# Patient Record
Sex: MALE | Race: WHITE | NOT HISPANIC OR LATINO
[De-identification: names, ages, dates, MRNs, and addresses within clinical notes are randomized per-mention and may not be internally consistent; named-entity substitution may affect disease eponyms.]

---

## 2017-04-11 PROBLEM — Z00.00 ENCOUNTER FOR PREVENTIVE HEALTH EXAMINATION: Status: ACTIVE | Noted: 2017-04-11

## 2017-05-09 ENCOUNTER — APPOINTMENT (OUTPATIENT)
Dept: OTOLARYNGOLOGY | Facility: CLINIC | Age: 47
End: 2017-05-09

## 2017-05-09 VITALS
HEART RATE: 84 BPM | DIASTOLIC BLOOD PRESSURE: 111 MMHG | SYSTOLIC BLOOD PRESSURE: 159 MMHG | WEIGHT: 230 LBS | HEIGHT: 68 IN | BODY MASS INDEX: 34.86 KG/M2

## 2017-05-09 DIAGNOSIS — Z86.39 PERSONAL HISTORY OF OTHER ENDOCRINE, NUTRITIONAL AND METABOLIC DISEASE: ICD-10-CM

## 2017-05-09 DIAGNOSIS — Z82.49 FAMILY HISTORY OF ISCHEMIC HEART DISEASE AND OTHER DISEASES OF THE CIRCULATORY SYSTEM: ICD-10-CM

## 2017-05-09 DIAGNOSIS — Z80.2 FAMILY HISTORY OF MALIGNANT NEOPLASM OF OTHER RESPIRATORY AND INTRATHORACIC ORGANS: ICD-10-CM

## 2017-05-09 DIAGNOSIS — J34.2 DEVIATED NASAL SEPTUM: ICD-10-CM

## 2017-05-09 DIAGNOSIS — Z78.9 OTHER SPECIFIED HEALTH STATUS: ICD-10-CM

## 2017-05-09 DIAGNOSIS — R49.0 DYSPHONIA: ICD-10-CM

## 2017-05-09 DIAGNOSIS — Z83.3 FAMILY HISTORY OF DIABETES MELLITUS: ICD-10-CM

## 2017-05-09 RX ORDER — LEVOTHYROXINE SODIUM 175 UG/1
175 TABLET ORAL
Refills: 0 | Status: ACTIVE | COMMUNITY

## 2018-07-23 PROBLEM — Z78.9 ALCOHOL USE: Status: ACTIVE | Noted: 2017-05-09

## 2019-01-09 ENCOUNTER — APPOINTMENT (OUTPATIENT)
Dept: DERMATOLOGY | Facility: CLINIC | Age: 49
End: 2019-01-09
Payer: COMMERCIAL

## 2019-01-09 PROCEDURE — 17110 DESTRUCTION B9 LES UP TO 14: CPT

## 2019-01-09 PROCEDURE — 99202 OFFICE O/P NEW SF 15 MIN: CPT | Mod: 25

## 2020-12-23 ENCOUNTER — APPOINTMENT (OUTPATIENT)
Dept: UROLOGY | Facility: CLINIC | Age: 50
End: 2020-12-23
Payer: COMMERCIAL

## 2020-12-23 VITALS
DIASTOLIC BLOOD PRESSURE: 114 MMHG | RESPIRATION RATE: 17 BRPM | WEIGHT: 249 LBS | HEIGHT: 68 IN | HEART RATE: 78 BPM | SYSTOLIC BLOOD PRESSURE: 168 MMHG | TEMPERATURE: 97.7 F | BODY MASS INDEX: 37.74 KG/M2

## 2020-12-23 PROCEDURE — 99072 ADDL SUPL MATRL&STAF TM PHE: CPT

## 2020-12-23 PROCEDURE — 99244 OFF/OP CNSLTJ NEW/EST MOD 40: CPT

## 2020-12-23 NOTE — REVIEW OF SYSTEMS
[Told you have blood in urine on a urine test] : told blood was present in a urine test [Strain or push to urinate] : strain or push to urinate [Slow urine stream] : slow urine stream [Negative] : Heme/Lymph

## 2020-12-25 LAB
APPEARANCE: CLEAR
BACTERIA UR CULT: NORMAL
BACTERIA: NEGATIVE
BILIRUBIN URINE: NEGATIVE
BLOOD URINE: NEGATIVE
COLOR: YELLOW
GLUCOSE QUALITATIVE U: NEGATIVE
HYALINE CASTS: 0 /LPF
KETONES URINE: NEGATIVE
LEUKOCYTE ESTERASE URINE: NEGATIVE
MICROSCOPIC-UA: NORMAL
NITRITE URINE: NEGATIVE
PH URINE: 6
PROTEIN URINE: NORMAL
RED BLOOD CELLS URINE: 0 /HPF
SPECIFIC GRAVITY URINE: 1.02
SQUAMOUS EPITHELIAL CELLS: 0 /HPF
URINE CYTOLOGY: NORMAL
UROBILINOGEN URINE: NORMAL
WHITE BLOOD CELLS URINE: 0 /HPF

## 2020-12-26 NOTE — LETTER HEADER
[FreeTextEntry3] : Chino Miramontes MD, PhD\par [facility]\par [address]\par [address] [place] [address]\[place] [address]

## 2020-12-26 NOTE — ADDENDUM
[FreeTextEntry1] : I, Ibeth Jamain, acted solely as a scribe for Dr. Chino Miramontes on this date 12/23/2020.\par \par All medical record entries made by the Scribe were at my, Dr. Chino Miramontes, direction and personally dictated by me on 12/23/2020. I have reviewed the chart and agree that the record accurately reflects my personal performance of the history, physical exam, assessment and plan.  I have also personally directed, reviewed and agreed with the chart.

## 2020-12-26 NOTE — ASSESSMENT
[FreeTextEntry1] : 12/23/2020: Mr. Holland is a 49y/o M presenting today for evaluation of pain in testicles. Complains of dull pain in R testicles only on palpation x 1 month. Denies pain on sitting. He initially felt sharp pain which resolved with drinking more water. Has not been drinking water recently and now feels a dull pain. Reports UA one month ago showing microscopic hematuria. Denies nocturia but admits pushing and straining. No h/o kidney stones. Diet of dairy and carbs. Reviewed lab work from 12/5/20 showing mildly elevated LFTs. Serum glucose elevated at 133. HbA1c was 6.2. PSA was 1.22. No asthma, seizures, or cardiac stents. Will be going for nuclear stress test in January. Works as . \par \par Digital rectal exam found no suspicious rectal masses. No rectal mucosal lesions. Anal tone is normal. The prostate is non tender, with normal texture, discrete borders, and no nodules. It is a 30 gram transurethral resection size. No gross blood on examining fingers. \par \par I prescribed the patient Bactrim BID with food x 2 weeks for orchitis. I advised the patient there is a chance of kidney insufficiency and upset stomach. \par I prescribed the patient Tamsulosin 0.4 mg once daily half hour after a meal for BPH. I advised the patient the side effects of nasal congestion, light-headedness, and lack of seminal emission. \par \par The patient produced a urine sample which will be sent for urinalysis, urine cytology, and urine culture. \par \par RTO in 2 weeks for US duplex scrotal.

## 2020-12-26 NOTE — LETTER BODY
[Dear  ___] : Dear  [unfilled], [Consult Letter:] : I had the pleasure of evaluating your patient, [unfilled]. [Please see my note below.] : Please see my note below. [Consult Closing:] : Thank you very much for allowing me to participate in the care of this patient.  If you have any questions, please do not hesitate to contact me. [Sincerely,] : Sincerely, [FreeTextEntry2] : Dr. Tavon Espinoza\par [address]\[place] [address] [FreeTextEntry1] : 12/23/2020: Mr. Holland is a 49y/o M presenting today for evaluation of pain in testicles. Complains of dull pain in R testicles only on palpation x 1 month. Denies pain on sitting. He initially felt sharp pain which resolved with drinking more water. Has not been drinking water recently and now feels a dull pain. Reports UA one month ago showing microscopic hematuria. Denies nocturia but admits pushing and straining. No h/o kidney stones. Diet of dairy and carbs. Reviewed lab work from 12/5/20 showing mildly elevated LFTs. Serum glucose elevated at 133. HbA1c was 6.2. PSA was 1.22. No asthma, seizures, or cardiac stents. Will be going for nuclear stress test in January. Works as . \par \par Digital rectal exam found no suspicious rectal masses. No rectal mucosal lesions. Anal tone is normal. The prostate is non tender, with normal texture, discrete borders, and no nodules. It is a 30 gram transurethral resection size. No gross blood on examining fingers. \par \par I prescribed the patient Bactrim BID with food x 2 weeks for orchitis. I advised the patient there is a chance of kidney insufficiency and upset stomach. \par I prescribed the patient Tamsulosin 0.4 mg once daily half hour after a meal for BPH. I advised the patient the side effects of nasal congestion, light-headedness, and lack of seminal emission. \par \par The patient produced a urine sample which will be sent for urinalysis, urine cytology, and urine culture. \par \par RTO in 2 weeks for US duplex scrotal.  [FreeTextEntry3] : Chino Miramontes MD, PhD

## 2020-12-26 NOTE — PHYSICAL EXAM
[General Appearance - Well Developed] : well developed [Normal Appearance] : normal appearance [General Appearance - In No Acute Distress] : no acute distress [Edema] : no peripheral edema [Respiration, Rhythm And Depth] : normal respiratory rhythm and effort [Exaggerated Use Of Accessory Muscles For Inspiration] : no accessory muscle use [Abdomen Soft] : soft [Abdomen Tenderness] : non-tender [Normal Station and Gait] : the gait and station were normal for the patient's age [] : no rash [No Focal Deficits] : no focal deficits [Oriented To Time, Place, And Person] : oriented to person, place, and time [Affect] : the affect was normal [Mood] : the mood was normal [Not Anxious] : not anxious [Heart Rate And Rhythm] : Heart rate and rhythm were normal [Abdomen Hernia] : no hernia was discovered [Costovertebral Angle Tenderness] : no ~M costovertebral angle tenderness [Urethral Meatus] : meatus normal [Penis Abnormality] : normal circumcised penis [Cervical Lymph Nodes Enlarged Posterior Bilaterally] : posterior cervical [Cervical Lymph Nodes Enlarged Anterior Bilaterally] : anterior cervical [Supraclavicular Lymph Nodes Enlarged Bilaterally] : supraclavicular [Femoral Lymph Nodes Enlarged Bilaterally] : femoral [Inguinal Lymph Nodes Enlarged Bilaterally] : inguinal [FreeTextEntry1] : No submandibular gland tenderness.

## 2021-01-13 ENCOUNTER — OUTPATIENT (OUTPATIENT)
Dept: OUTPATIENT SERVICES | Facility: HOSPITAL | Age: 51
LOS: 1 days | End: 2021-01-13
Payer: COMMERCIAL

## 2021-01-13 ENCOUNTER — APPOINTMENT (OUTPATIENT)
Dept: UROLOGY | Facility: CLINIC | Age: 51
End: 2021-01-13
Payer: COMMERCIAL

## 2021-01-13 DIAGNOSIS — R35.0 FREQUENCY OF MICTURITION: ICD-10-CM

## 2021-01-13 PROCEDURE — 76870 US EXAM SCROTUM: CPT

## 2021-01-13 PROCEDURE — 99072 ADDL SUPL MATRL&STAF TM PHE: CPT

## 2021-01-13 PROCEDURE — 99214 OFFICE O/P EST MOD 30 MIN: CPT | Mod: 25

## 2021-01-13 PROCEDURE — 76870 US EXAM SCROTUM: CPT | Mod: 26

## 2021-01-13 RX ORDER — TAMSULOSIN HYDROCHLORIDE 0.4 MG/1
0.4 CAPSULE ORAL
Qty: 30 | Refills: 11 | Status: COMPLETED | COMMUNITY
Start: 2020-12-23 | End: 2021-01-13

## 2021-01-13 RX ORDER — ALFUZOSIN HYDROCHLORIDE 10 MG/1
10 TABLET, EXTENDED RELEASE ORAL
Qty: 30 | Refills: 11 | Status: ACTIVE | COMMUNITY
Start: 2021-01-13 | End: 1900-01-01

## 2021-01-13 NOTE — ASSESSMENT
[FreeTextEntry1] : 12/23/2020: Mr. Obando is a 51y/o M presenting today for evaluation of pain in testicles. Complains of dull pain in R testicles only on palpation x 1 month. Denies pain on sitting. He initially felt sharp pain which resolved with drinking more water. Has not been drinking water recently and now feels a dull pain. Reports UA one month ago showing microscopic hematuria. Denies nocturia but admits pushing and straining. No h/o kidney stones. Diet of dairy and carbs. Reviewed lab work from 12/5/20 showing mildly elevated LFTs. Serum glucose elevated at 133. HbA1c was 6.2. PSA was 1.22. No asthma, seizures, or cardiac stents. Will be going for nuclear stress test in January. Works as . Digital rectal exam found no suspicious rectal masses. No rectal mucosal lesions. Anal tone is normal. The prostate is non tender, with normal texture, discrete borders, and no nodules. It is a 30 gram transurethral resection size. No gross blood on examining fingers. \par \par 01/13/2021: Patient presents today for duplex scrotal US. Results show b/l varicoceles, left larger than right, and cyst of left testicle. No hydrocele, spermatocele, intratesticular masses. Epididymides are normal. Denies pain on coughing or heavy lifting. Last visit, pt was prescribed Tamsulosin once daily which did improve his urination, but stopped as he was concerned with lack of seminal emission. On 12/23/20, urine culture showed no growth, UA was normal. Creatinine normal at 1.01. No FHx of prostate cancer. \par \par D/c Tamsulosin, and will instead start Alfuzosin 10mg, once daily with food. I informed the patient of lightheadedness as a side effect. \par \par Patient will schedule telehealth visit in 2 weeks.

## 2021-01-13 NOTE — HISTORY OF PRESENT ILLNESS
[FreeTextEntry1] : 12/23/2020: Mr. Obando is a 49y/o M presenting today for evaluation of pain in testicles. Complains of dull pain in R testicles only on palpation x 1 month. Denies pain on sitting. He initially felt sharp pain which resolved with drinking more water. Has not been drinking water recently and now feels a dull pain. Reports UA one month ago showing microscopic hematuria. Denies nocturia but admits pushing and straining. No h/o kidney stones. Diet of dairy and carbs. Reviewed lab work from 12/5/20 showing mildly elevated LFTs. Serum glucose elevated at 133. HbA1c was 6.2. PSA was 1.22. No asthma, seizures, or cardiac stents. Will be going for nuclear stress test in January. Works as . Digital rectal exam found no suspicious rectal masses. No rectal mucosal lesions. Anal tone is normal. The prostate is non tender, with normal texture, discrete borders, and no nodules. It is a 30 gram transurethral resection size. No gross blood on examining fingers. \par \par 01/13/2021: Patient presents today for duplex scrotal US. Results show b/l varicoceles, left larger than right, and cyst of left testicle. No hydrocele, spermatocele, intratesticular masses. Epididymides are normal. Denies pain on coughing or heavy lifting. Last visit, pt was prescribed Tamsulosin once daily which did improve his urination, but stopped as he was concerned with lack of seminal emission. On 12/23/20, urine culture showed no growth, UA was normal. Creatinine normal at 1.01. No FHx of prostate cancer.

## 2021-01-13 NOTE — ADDENDUM
[FreeTextEntry1] : I, Ibeth Jamain, acted solely as a scribe for Dr. Chino Miramontes on this date 01/13/2021.\par \par All medical record entries made by the Scribe were at my, Dr. Chino Miramontes, direction and personally dictated by me on 01/13/2021. I have reviewed the chart and agree that the record accurately reflects my personal performance of the history, physical exam, assessment and plan.  I have also personally directed, reviewed and agreed with the chart.

## 2021-01-13 NOTE — PHYSICAL EXAM
[General Appearance - Well Developed] : well developed [Normal Appearance] : normal appearance [General Appearance - In No Acute Distress] : no acute distress [Abdomen Tenderness] : non-tender [Abdomen Soft] : soft [Edema] : no peripheral edema [] : no respiratory distress [Respiration, Rhythm And Depth] : normal respiratory rhythm and effort [Exaggerated Use Of Accessory Muscles For Inspiration] : no accessory muscle use [Oriented To Time, Place, And Person] : oriented to person, place, and time [Affect] : the affect was normal [Mood] : the mood was normal [Not Anxious] : not anxious [Normal Station and Gait] : the gait and station were normal for the patient's age [No Focal Deficits] : no focal deficits

## 2021-01-15 DIAGNOSIS — N40.1 BENIGN PROSTATIC HYPERPLASIA WITH LOWER URINARY TRACT SYMPTOMS: ICD-10-CM

## 2021-01-15 DIAGNOSIS — N45.2 ORCHITIS: ICD-10-CM

## 2021-07-09 ENCOUNTER — NON-APPOINTMENT (OUTPATIENT)
Age: 51
End: 2021-07-09

## 2021-07-13 ENCOUNTER — APPOINTMENT (OUTPATIENT)
Dept: UROLOGY | Facility: CLINIC | Age: 51
End: 2021-07-13

## 2021-08-05 ENCOUNTER — APPOINTMENT (OUTPATIENT)
Dept: UROLOGY | Facility: CLINIC | Age: 51
End: 2021-08-05
Payer: COMMERCIAL

## 2021-08-05 VITALS
DIASTOLIC BLOOD PRESSURE: 89 MMHG | SYSTOLIC BLOOD PRESSURE: 143 MMHG | RESPIRATION RATE: 16 BRPM | OXYGEN SATURATION: 98 % | HEIGHT: 68 IN | HEART RATE: 85 BPM | BODY MASS INDEX: 37.89 KG/M2 | WEIGHT: 250 LBS

## 2021-08-05 DIAGNOSIS — N13.8 BENIGN PROSTATIC HYPERPLASIA WITH LOWER URINARY TRACT SYMPMS: ICD-10-CM

## 2021-08-05 DIAGNOSIS — L72.9 FOLLICULAR CYST OF THE SKIN AND SUBCUTANEOUS TISSUE, UNSPECIFIED: ICD-10-CM

## 2021-08-05 DIAGNOSIS — N45.2 ORCHITIS: ICD-10-CM

## 2021-08-05 DIAGNOSIS — N40.1 BENIGN PROSTATIC HYPERPLASIA WITH LOWER URINARY TRACT SYMPMS: ICD-10-CM

## 2021-08-05 PROCEDURE — 99214 OFFICE O/P EST MOD 30 MIN: CPT

## 2021-08-05 RX ORDER — SULFAMETHOXAZOLE AND TRIMETHOPRIM 800; 160 MG/1; MG/1
800-160 TABLET ORAL
Qty: 28 | Refills: 0 | Status: ACTIVE | COMMUNITY
Start: 2020-12-23 | End: 1900-01-01

## 2021-08-08 LAB
APPEARANCE: CLEAR
BACTERIA: NEGATIVE
BILIRUBIN URINE: NEGATIVE
BLOOD URINE: NEGATIVE
COLOR: YELLOW
GLUCOSE QUALITATIVE U: NEGATIVE
HYALINE CASTS: 0 /LPF
KETONES URINE: NEGATIVE
LEUKOCYTE ESTERASE URINE: NEGATIVE
MICROSCOPIC-UA: NORMAL
NITRITE URINE: NEGATIVE
PH URINE: 7
PROTEIN URINE: NORMAL
RED BLOOD CELLS URINE: 1 /HPF
SPECIFIC GRAVITY URINE: 1.02
SQUAMOUS EPITHELIAL CELLS: 0 /HPF
UROBILINOGEN URINE: NORMAL
WHITE BLOOD CELLS URINE: 0 /HPF

## 2021-08-08 NOTE — ASSESSMENT
[FreeTextEntry1] : 12/23/2020: Mr. Obando is a 52y/o M presenting today for evaluation of pain in testicles. Complains of dull pain in R testicles only on palpation x 1 month. Denies pain on sitting. He initially felt sharp pain which resolved with drinking more water. Has not been drinking water recently and now feels a dull pain. Reports UA one month ago showing microscopic hematuria. Denies nocturia but admits pushing and straining. No h/o kidney stones. Diet of dairy and carbs. Reviewed lab work from 12/5/20 showing mildly elevated LFTs. Serum glucose elevated at 133. HbA1c was 6.2. PSA was 1.22. No asthma, seizures, or cardiac stents. Will be going for nuclear stress test in January. Works as . Digital rectal exam found no suspicious rectal masses. No rectal mucosal lesions. Anal tone is normal. The prostate is non tender, with normal texture, discrete borders, and no nodules. It is a 30 gram transurethral resection size. No gross blood on examining fingers. \par \par 01/13/2021: Patient presents today for duplex scrotal US. Results show b/l varicoceles, left larger than right, and cyst of left testicle. No hydrocele, spermatocele, intratesticular masses. Epididymides are normal. Denies pain on coughing or heavy lifting. Last visit, pt was prescribed Tamsulosin once daily which did improve his urination, but stopped as he was concerned with lack of seminal emission. On 12/23/20, urine culture showed no growth, UA was normal. Creatinine normal at 1.01. No FHx of prostate cancer. \par \par 08/05/2021: Patient presents today for follow up. Patient states he did not start Alfuzosin as prescribed last visit as he was concerned about side effects and did not want to start another medication. He took Tamsulosin in the past, his urination was better. Took an antibiotic in the past with which resolved testicular pain. Additionally complains of right testicular soreness. He also notes that he feels a nodule in his groin. \par \par I explained to the patient that starting Alfuzosin will improve his urination and he will have normal ejaculatory volume, which was his concern with Tamsulosin. Patient is agreeable with starting Alfuzosin ER 10mg once daily. \par \par I prescribed the patient Bactrim BID for right testicular soreness. I advised the patient there is a chance of kidney insufficiency and upset stomach. \par \par Recommend warm soaks for his sebaceous cyst in groin.\par \par The patient produced a urine sample which will be sent for urinalysis, urine cytology, and urine culture. \par \par RTO in 3-4 weeks for follow up. \par \par Preparation, in-person office visit, and coordination of care took: 30 minutes

## 2021-08-08 NOTE — PHYSICAL EXAM
[General Appearance - Well Developed] : well developed [Normal Appearance] : normal appearance [General Appearance - In No Acute Distress] : no acute distress [Abdomen Soft] : soft [Abdomen Tenderness] : non-tender [Edema] : no peripheral edema [] : no respiratory distress [Respiration, Rhythm And Depth] : normal respiratory rhythm and effort [Exaggerated Use Of Accessory Muscles For Inspiration] : no accessory muscle use [Oriented To Time, Place, And Person] : oriented to person, place, and time [Affect] : the affect was normal [Mood] : the mood was normal [Not Anxious] : not anxious [Normal Station and Gait] : the gait and station were normal for the patient's age [No Focal Deficits] : no focal deficits [Penis Abnormality] : normal circumcised penis [Epididymis] : the epididymides were normal [Testes Tenderness] : no tenderness of the testes [Testes Mass (___cm)] : there were no testicular masses [Inguinal Lymph Nodes Enlarged Bilaterally] : inguinal [FreeTextEntry1] : ovoid subcutaneous mass at junction between scrotum and mons pubis, it is mainly in scrotum, mobile, nontender, about 1 cm x 0.8 cm, long axis is superior to inferior. overlying skin not cellulitic. epidermis is intact. testes b/l descended. L testis is 22 cc. R testis is 19 cc. normal sensitivity. complains of right testicular soreness, but not clinically tender in neither right nor left.

## 2021-08-08 NOTE — HISTORY OF PRESENT ILLNESS
[FreeTextEntry1] : 12/23/2020: Mr. Obando is a 51y/o M presenting today for evaluation of pain in testicles. Complains of dull pain in R testicles only on palpation x 1 month. Denies pain on sitting. He initially felt sharp pain which resolved with drinking more water. Has not been drinking water recently and now feels a dull pain. Reports UA one month ago showing microscopic hematuria. Denies nocturia but admits pushing and straining. No h/o kidney stones. Diet of dairy and carbs. Reviewed lab work from 12/5/20 showing mildly elevated LFTs. Serum glucose elevated at 133. HbA1c was 6.2. PSA was 1.22. No asthma, seizures, or cardiac stents. Will be going for nuclear stress test in January. Works as . Digital rectal exam found no suspicious rectal masses. No rectal mucosal lesions. Anal tone is normal. The prostate is non tender, with normal texture, discrete borders, and no nodules. It is a 30 gram transurethral resection size. No gross blood on examining fingers. \par \par 01/13/2021: Patient presents today for duplex scrotal US. Results show b/l varicoceles, left larger than right, and cyst of left testicle. No hydrocele, spermatocele, intratesticular masses. Epididymides are normal. Denies pain on coughing or heavy lifting. Last visit, pt was prescribed Tamsulosin once daily which did improve his urination, but stopped as he was concerned with lack of seminal emission. On 12/23/20, urine culture showed no growth, UA was normal. Creatinine normal at 1.01. No FHx of prostate cancer. \par \par 08/05/2021: Patient presents today for follow up. Patient states he did not start Alfuzosin as prescribed last visit as he was concerned about side effects and did not want to start another medication. He took Tamsulosin in the past, his urination was better. Took an antibiotic in the past with which resolved testicular pain. Additionally complains of right testicular soreness. He also notes that he feels a nodule in his groin.

## 2021-08-16 LAB
BACTERIA UR CULT: NORMAL
URINE CYTOLOGY: NORMAL

## 2021-08-19 ENCOUNTER — APPOINTMENT (OUTPATIENT)
Dept: UROLOGY | Facility: CLINIC | Age: 51
End: 2021-08-19

## 2022-02-11 ENCOUNTER — EMERGENCY (EMERGENCY)
Facility: HOSPITAL | Age: 52
LOS: 1 days | Discharge: HOME | End: 2022-02-11
Attending: EMERGENCY MEDICINE
Payer: COMMERCIAL

## 2022-02-11 VITALS
RESPIRATION RATE: 20 BRPM | OXYGEN SATURATION: 98 % | TEMPERATURE: 98 F | SYSTOLIC BLOOD PRESSURE: 174 MMHG | HEIGHT: 69 IN | DIASTOLIC BLOOD PRESSURE: 98 MMHG | HEART RATE: 107 BPM | WEIGHT: 250 LBS

## 2022-02-11 DIAGNOSIS — R07.9 CHEST PAIN, UNSPECIFIED: ICD-10-CM

## 2022-02-11 DIAGNOSIS — I10 ESSENTIAL (PRIMARY) HYPERTENSION: ICD-10-CM

## 2022-02-11 LAB
ALBUMIN SERPL ELPH-MCNC: 4.7 G/DL — SIGNIFICANT CHANGE UP (ref 3.3–5.2)
ALP SERPL-CCNC: 59 U/L — SIGNIFICANT CHANGE UP (ref 40–120)
ALT FLD-CCNC: 86 U/L — HIGH
ANION GAP SERPL CALC-SCNC: 14 MMOL/L — SIGNIFICANT CHANGE UP (ref 5–17)
AST SERPL-CCNC: 46 U/L — HIGH
BASOPHILS # BLD AUTO: 0.08 K/UL — SIGNIFICANT CHANGE UP (ref 0–0.2)
BASOPHILS NFR BLD AUTO: 0.8 % — SIGNIFICANT CHANGE UP (ref 0–2)
BILIRUB SERPL-MCNC: 0.3 MG/DL — LOW (ref 0.4–2)
BUN SERPL-MCNC: 17 MG/DL — SIGNIFICANT CHANGE UP (ref 8–20)
CALCIUM SERPL-MCNC: 10 MG/DL — SIGNIFICANT CHANGE UP (ref 8.6–10.2)
CHLORIDE SERPL-SCNC: 103 MMOL/L — SIGNIFICANT CHANGE UP (ref 98–107)
CO2 SERPL-SCNC: 23 MMOL/L — SIGNIFICANT CHANGE UP (ref 22–29)
CREAT SERPL-MCNC: 0.87 MG/DL — SIGNIFICANT CHANGE UP (ref 0.5–1.3)
D DIMER BLD IA.RAPID-MCNC: <150 NG/ML DDU — SIGNIFICANT CHANGE UP
EOSINOPHIL # BLD AUTO: 0.14 K/UL — SIGNIFICANT CHANGE UP (ref 0–0.5)
EOSINOPHIL NFR BLD AUTO: 1.5 % — SIGNIFICANT CHANGE UP (ref 0–6)
GLUCOSE SERPL-MCNC: 104 MG/DL — HIGH (ref 70–99)
HCT VFR BLD CALC: 38.4 % — LOW (ref 39–50)
HGB BLD-MCNC: 13.7 G/DL — SIGNIFICANT CHANGE UP (ref 13–17)
IMM GRANULOCYTES NFR BLD AUTO: 0.4 % — SIGNIFICANT CHANGE UP (ref 0–1.5)
LYMPHOCYTES # BLD AUTO: 1.73 K/UL — SIGNIFICANT CHANGE UP (ref 1–3.3)
LYMPHOCYTES # BLD AUTO: 18.1 % — SIGNIFICANT CHANGE UP (ref 13–44)
MCHC RBC-ENTMCNC: 28.4 PG — SIGNIFICANT CHANGE UP (ref 27–34)
MCHC RBC-ENTMCNC: 35.7 GM/DL — SIGNIFICANT CHANGE UP (ref 32–36)
MCV RBC AUTO: 79.5 FL — LOW (ref 80–100)
MONOCYTES # BLD AUTO: 0.86 K/UL — SIGNIFICANT CHANGE UP (ref 0–0.9)
MONOCYTES NFR BLD AUTO: 9 % — SIGNIFICANT CHANGE UP (ref 2–14)
NEUTROPHILS # BLD AUTO: 6.69 K/UL — SIGNIFICANT CHANGE UP (ref 1.8–7.4)
NEUTROPHILS NFR BLD AUTO: 70.2 % — SIGNIFICANT CHANGE UP (ref 43–77)
PLATELET # BLD AUTO: 273 K/UL — SIGNIFICANT CHANGE UP (ref 150–400)
POTASSIUM SERPL-MCNC: 3.9 MMOL/L — SIGNIFICANT CHANGE UP (ref 3.5–5.3)
POTASSIUM SERPL-SCNC: 3.9 MMOL/L — SIGNIFICANT CHANGE UP (ref 3.5–5.3)
PROT SERPL-MCNC: 7.3 G/DL — SIGNIFICANT CHANGE UP (ref 6.6–8.7)
RBC # BLD: 4.83 M/UL — SIGNIFICANT CHANGE UP (ref 4.2–5.8)
RBC # FLD: 13 % — SIGNIFICANT CHANGE UP (ref 10.3–14.5)
SARS-COV-2 RNA SPEC QL NAA+PROBE: SIGNIFICANT CHANGE UP
SODIUM SERPL-SCNC: 139 MMOL/L — SIGNIFICANT CHANGE UP (ref 135–145)
TROPONIN T SERPL-MCNC: <0.01 NG/ML — SIGNIFICANT CHANGE UP (ref 0–0.06)
TROPONIN T SERPL-MCNC: <0.01 NG/ML — SIGNIFICANT CHANGE UP (ref 0–0.06)
WBC # BLD: 9.54 K/UL — SIGNIFICANT CHANGE UP (ref 3.8–10.5)
WBC # FLD AUTO: 9.54 K/UL — SIGNIFICANT CHANGE UP (ref 3.8–10.5)

## 2022-02-11 PROCEDURE — 93010 ELECTROCARDIOGRAM REPORT: CPT | Mod: 76

## 2022-02-11 PROCEDURE — 99220: CPT

## 2022-02-11 PROCEDURE — 71046 X-RAY EXAM CHEST 2 VIEWS: CPT | Mod: 26

## 2022-02-11 RX ORDER — TIMOLOL 0.5 %
1 DROPS OPHTHALMIC (EYE) EVERY 12 HOURS
Refills: 0 | Status: DISCONTINUED | OUTPATIENT
Start: 2022-02-11 | End: 2022-02-11

## 2022-02-11 RX ORDER — LISINOPRIL 2.5 MG/1
5 TABLET ORAL DAILY
Refills: 0 | Status: DISCONTINUED | OUTPATIENT
Start: 2022-02-11 | End: 2022-02-16

## 2022-02-11 RX ORDER — ASPIRIN/CALCIUM CARB/MAGNESIUM 324 MG
162 TABLET ORAL ONCE
Refills: 0 | Status: COMPLETED | OUTPATIENT
Start: 2022-02-11 | End: 2022-02-11

## 2022-02-11 RX ORDER — BRIMONIDINE TARTRATE 2 MG/MG
1 SOLUTION/ DROPS OPHTHALMIC EVERY 12 HOURS
Refills: 0 | Status: DISCONTINUED | OUTPATIENT
Start: 2022-02-11 | End: 2022-02-11

## 2022-02-11 RX ORDER — LEVOTHYROXINE SODIUM 125 MCG
224 TABLET ORAL DAILY
Refills: 0 | Status: DISCONTINUED | OUTPATIENT
Start: 2022-02-11 | End: 2022-02-16

## 2022-02-11 RX ORDER — TAMSULOSIN HYDROCHLORIDE 0.4 MG/1
0.4 CAPSULE ORAL
Refills: 0 | Status: DISCONTINUED | OUTPATIENT
Start: 2022-02-11 | End: 2022-02-16

## 2022-02-11 RX ORDER — MORPHINE SULFATE 50 MG/1
2 CAPSULE, EXTENDED RELEASE ORAL ONCE
Refills: 0 | Status: DISCONTINUED | OUTPATIENT
Start: 2022-02-11 | End: 2022-02-11

## 2022-02-11 RX ADMIN — MORPHINE SULFATE 2 MILLIGRAM(S): 50 CAPSULE, EXTENDED RELEASE ORAL at 19:14

## 2022-02-11 RX ADMIN — Medication 162 MILLIGRAM(S): at 22:55

## 2022-02-11 RX ADMIN — Medication 162 MILLIGRAM(S): at 20:26

## 2022-02-11 NOTE — CONSULT NOTE ADULT - PROBLEM SELECTOR RECOMMENDATION 9
Silodosin (started 3 weeks ago)  unusual tiredness or weakness  ro PE Silodosin (started 3 weeks ago)  unusual tiredness or weakness  Telemonitor overnight    mg x 1   Trend trops x 3 a6. Trend CE. Serial EKGs  Continue morphine PRN x chest pain   Echo in AM to assess structural and functional status  Nuclear stress test   CTA chest   ro PE pt was tachycardic at arrival   Maintain K+~4 and mag ~2  A1C, TFTs, sed rate and lipid panel fasting to ro comorbidities   DASH diet Pt co chest pain (resolved after morphine)  Family history: mom  of MI @ age 55,  Silodosin (started 3 weeks ago)  unusual tiredness or weakness    Telemonitor overnight    mg x 1   Trend trops x 3 a6. Trend CE. Serial EKGs  Continue morphine PRN x chest pain   Echo in AM to assess structural and functional status  Nuclear stress test   CTA chest   ro PE pt was tachycardic at arrival   Maintain K+~4 and mag ~2  A1C, TFTs, sed rate and lipid panel fasting to ro comorbidities   DASH diet Pt co chest pain on exertion that resolved after morphine and SOB and fatigue with and w/o exertion. He has Hx of pre-DM and HTN. EKG shows Sinus tachycardia with Q waves on Lead lll which may represent old MI. He has family history of MI, Mother  of MI @ age 55. Pt states being under a lot of stress right now at work and at home.   Telemonitor overnight    mg x 1   Trend trops x 3 a6. Trend CE. Serial EKGs  Continue morphine PRN x chest pain   Echo in AM to assess structural and functional status  Nuclear stress test   CTA chest   ro PE pt was tachycardic at arrival   Maintain K+~4 and mag ~2  A1C, TFTs, sed rate and lipid panel fasting to ro comorbidities   DASH diet Pt co chest pain on exertion that resolved after morphine and SOB and fatigue/tiredness with and w/o exertion. He has Hx of pre-DM and HTN. EKG shows Sinus tachycardia with Q waves on Lead lll which may represent old MI. He has family history of MI, Mother  of MI @ age 55. Pt states being under a lot of stress right now at work and at home.   Telemonitor overnight    mg x 1   Trend trops x 3 a6. Trend CE. Serial EKGs  Continue morphine PRN x chest pain   Echo in AM to assess structural and functional status  Nuclear stress test   CTA chest   ro PE pt was tachycardic at arrival   Maintain K+~4 and mag ~2  A1C, TFTs, sed rate and lipid panel fasting to ro comorbidities   DASH diet  If all tests are negative for CAD, consider side effects from Silodosin which is a new medication that this pt started 3 weeks ago and could cause unusual tiredness Pt co chest pain on exertion that resolved after morphine and SOB and fatigue/tiredness with and w/o exertion. He has Hx of pre-DM and HTN. EKG shows Sinus tachycardia with Q waves on Lead lll. He has family history of MI, Mother  of MI @ age 55. Pt states being under a lot of stress right now at work and at home.   Typical chest pain. Most likely anginal   Telemonitor overnight    mg x 1   Trend trops x 3 a6. Trend CE. Serial EKGs  Continue morphine PRN x chest pain   Echo in AM to assess structural and functional status  Nuclear stress test   CTA chest   ro PE pt was tachycardic at arrival   Maintain K+~4 and mag ~2  A1C, TFTs, sed rate and lipid panel fasting to ro comorbidities   DASH diet  If all tests are negative for CAD, consider side effects from Silodosin which is a new medication that this pt started 3 weeks ago and could cause unusual tiredness Pt co chest pain on exertion that resolved after morphine and SOB and fatigue/tiredness with and w/o exertion. He has Hx of pre-DM and HTN. EKG shows Sinus tachycardia with Q waves on Lead lll. He has family history of MI, Mother  of MI @ age 55. Pt states being under a lot of stress right now at work and at home.   Typical chest pain. Most likely anginal   Telemonitor overnight    mg x 1   Trend trops x 3 a6. Trend CE. Serial EKGs  Continue morphine PRN x chest pain   Echo in AM to assess structural and functional status  Nuclear stress test   CTA chest   Maintain K+~4 and mag ~2  A1C, TFTs, sed rate and lipid panel fasting to ro comorbidities   DASH diet  If all tests are negative for CAD, consider side effects from Silodosin which is a new medication that this pt started 3 weeks ago and could cause unusual tiredness

## 2022-02-11 NOTE — ED ADULT TRIAGE NOTE - CHIEF COMPLAINT QUOTE
Pt arrives to ED c/o mid sternal chest pain started yesterday while walking , pain returned today with walking again

## 2022-02-11 NOTE — ED PROVIDER NOTE - CLINICAL SUMMARY MEDICAL DECISION MAKING FREE TEXT BOX
51 year old male with history of HTN and prediabetes who presents with chest pain for the last 1-2 weeks with accompanying MCNULTY. L 51 year old male with history of HTN and prediabetes who presents with chest pain for the last 1-2 weeks with accompanying MCNULTY.  Labs unrevealing however pt continued to have angina. When considering risk factors patient with HEART score of 4. 51 year old male with history of HTN and prediabetes who presents with chest pain for the last 1-2 weeks with accompanying MCNULTY.  Labs unrevealing however pt continued to have angina. When considering risk factors patient with HEART score of 4. SS cards paged and recommending overnight observation with repeat trops/EKGs and ECHO in AM. Pt agreeable.

## 2022-02-11 NOTE — CONSULT NOTE ADULT - ASSESSMENT
51 year old male presents to the ER co chest pain x 2 weeks.           Patient has been experiencing central, non-radiating, 3/10 intermittent chest pain of a "tightness" quality. His chest pain is mostly with activity and he has accompanying dyspnea and heart racing sensation. Has been feeling more fatigued and SOB for the last 2 week with activity and at rest. Also was at a party last week dancing/singing and felt like he was sweating more than usual. States that he was seen by his cardiologist Dr. Lee about 2-3 weeks ago and EKG done at that time was normal.  Admits to significant stress with his career and at home right now. Family history: mom  of MI @ age 55. Denies syncope, dizziness, abdominal pain N/V or diarrhea.    51 year old male presents to the ER co chest pain x 2 weeks.           Silodosin (started 3 weeks ago)  unusual tiredness or weakness 51 year old male presents to the ER co chest pain x 2 weeks.

## 2022-02-11 NOTE — ED PROVIDER NOTE - ATTENDING CONTRIBUTION TO CARE
Pt. awake and alert. Pt. complaining of chest tightness. Lungs-CTA b/l. Heart-RRR. Abdomen-soft/NT.   I, Dr. Osman, performed a face to face bedside interview with this patient regarding history of present illness, review of symptoms and relevant past medical, social and family history.  I completed an independent physical examination.  I have also reviewed the resident's note(s) and discussed the plan with the resident.

## 2022-02-11 NOTE — ED CDU PROVIDER INITIAL DAY NOTE - ATTENDING CONTRIBUTION TO CARE
51 YOM HTN, BPH, prediabetes who presents with chest pain for last week, non radiating, feels tight. worse with exertion. also with generalized fatigue. Reports had stress test over 1 year ago that was unremarkable. in ed, initial ekg/trop wnl, ddimer negative. seen by cardiology, will trend troponins, telemetry monitoring, echo. reassess in morning.

## 2022-02-11 NOTE — ED CDU PROVIDER INITIAL DAY NOTE - NSICDXPASTMEDICALHX_GEN_ALL_CORE_FT
PAST MEDICAL HISTORY:  BPH without urinary obstruction     Hypertension     Hypothyroid     Prediabetes

## 2022-02-11 NOTE — CONSULT NOTE ADULT - SUBJECTIVE AND OBJECTIVE BOX
[place] [facility]                                                        Office: [address] [address]                                                       Telephone: [phone]. Fax:[phone]    CARDIOLOGY CONSULTATION NOTE                                                                                             History obtained by: Patient and medical record   obtained: No    HPI: 51 year old male with PMHx of HTN and prediabetes presents to the ER co chest pain x 2 weeks. Patient has been experiencing central, non-radiating, 3/10 intermittent chest pain of a "tightness" quality. His chest pain is mostly with activity and he has accompanying dyspnea and heart racing sensation. Has been feeling more fatigued and SOB for the last 2 week with activity and at rest. Also was at a party last week dancing/singing and felt like he was sweating more than usual. States that he was seen by his cardiologist Dr. Lee about 2-3 weeks ago and EKG done at that time was normal.  Admits to significant stress with his career and at home right now. Family history: mom  of MI @ age 55. Denies syncope, dizziness, abdominal pain N/V or diarrhea.     Cardiologist: Dr Nikolas Lee     REVIEW OF SYMPTOMS: Asymptomatic.  Cardiovascular:  + chest pain (resolved after morphine),  No dyspnea,  No fatigue, No syncope,  No palpitations, No dizziness, No Orthopnea, No Paroxsymal nocturnal dyspnea.  Respiratory:  No Dyspnea, No cough.  Genitourinary:  No dysuria, no hematuria.  Gastrointestinal:  No nausea, no vomiting. No diarrhea.  No abdominal pain.   Neurological: No headache, no dizziness, no slurred speech.  Psychiatric: No agitation, no anxiety.    ALL OTHER REVIEW OF SYSTEMS ARE NEGATIVE.    Allergies  Denies    CURRENT MEDICATIONS:  MEDICATIONS  (STANDING):  levothyroxine 224 MICROGram(s) Oral daily  lisinopril 5 milliGRAM(s) Oral daily  tamsulosin 0.4 milliGRAM(s) Oral <User Schedule>    Home Medications:  Ramipril 5 milliGRAM(s) Oral daily  Silodosin 5 mg OD  levothyroxine    Past Medical History  Hypertension  Prediabetes    Past Surgical History      FAMILY HISTORY:  Family history: mom  of MI @ age 55, father  of larynx cancer @ age 46    Social History:  Denies ever smoking, alcohol or drugs use:    Vital Signs Last 24 Hrs  T(C): 36.7 (2022 15:41), Max: 36.7 (2022 15:41)  T(F): 98 (2022 15:41), Max: 98 (2022 15:41)  HR: 89 (2022 18:40) (89 - 107)  BP: 177/99 (2022 18:40) (174/98 - 177/99)  BP(mean): --  RR: 18 (2022 18:40) (18 - 20)  SpO2: 99% (2022 18:40) (98% - 99%)    PHYSICAL EXAM:  Constitutional: Comfortable . No acute distress.   HEENT: Atraumatic and normcephalic , neck is supple . no JVD.  PEERL   CNS: A&O x3. No focal neurologic deficits.   Respiratory: CTAB. No wheezing, rhonchi or crackles   Cardiovascular: Reproducible chest pain. S1S2 RRR. No murmuror rubs  Gastrointestinal: Soft non-tender and non distended . +Bowel sounds.   Extremities: No pitting  edema x 4 extremities  Psychiatric: Calm . no agitation.  Skin: No skin rash/ulcers visualized to face, hands or feet.    Intake and output:     LABS:                        13.7   9.54  )-----------( 273      ( 2022 17:32 )             38.4         139  |  103  |  17.0  ----------------------------<  104<H>  3.9   |  23.0  |  0.87    Ca    10.0      2022 17:32    TPro  7.3  /  Alb  4.7  /  TBili  0.3<L>  /  DBili  x   /  AST  46<H>  /  ALT  86<H>  /  AlkPhos  59      CARDIAC MARKERS ( 2022 17:32 )  x     / <0.01 ng/mL / x     / x     / x        ;p-BNP=Serum Pro-Brain Natriuretic Peptide: 10 pg/mL ( @ 17:32)          INTERPRETATION OF TELEMETRY: Reviewed by me.     EKG: Reviewed by me.     RADIOLOGY & ADDITIONAL STUDIES:     X-ray:  reviewed by me.     CT scan:     ECHO FINDINGS:     STRESS  FINDINGS:     CATHETERIZATION FINDINGS:  Date:  Preliminary evaluation, please await official recommendations by Dr. Callejas       Assessment and recommendations are final when note is signed by the attending.                                                                        [place] [facility]                                                       [facility]                                                        Office: [address] [address]                                                       Telephone: [phone]. Fax:[phone]    CARDIOLOGY CONSULTATION NOTE                                                                                             History obtained by: Patient and medical record   obtained: No    HPI: 51 year old male with PMHx of hypothyroidism, HTN and prediabetes presents to the ER co chest pain x 2 weeks. Patient has been experiencing central, non-radiating, 3/10 intermittent chest pain of a "tightness" quality. His chest pain is mostly with activity and he has accompanying dyspnea and heart racing sensation. Has been feeling more fatigued and SOB for the last 2 week with activity and at rest. Also was at a party last week dancing/singing and felt like he was sweating more than usual. States that he was seen by his cardiologist Dr. Lee about 2-3 weeks ago and EKG done at that time was normal.  Admits to significant stress with his career and at home right now. Family history: mom  of MI @ age 55. Denies syncope, dizziness, abdominal pain N/V or diarrhea.     Cardiologist: Dr Nikolas Lee     REVIEW OF SYMPTOMS: Asymptomatic.  Cardiovascular:  + chest pain (resolved after morphine),  No dyspnea,  No fatigue, No syncope,  No palpitations, No dizziness, No Orthopnea, No Paroxsymal nocturnal dyspnea.  Respiratory:  No Dyspnea, No cough.  Genitourinary:  No dysuria, no hematuria.  Gastrointestinal:  No nausea, no vomiting. No diarrhea.  No abdominal pain.   Neurological: No headache, no dizziness, no slurred speech.  Psychiatric: No agitation, + anxiety.    ALL OTHER REVIEW OF SYSTEMS ARE NEGATIVE.    Allergies  Denies    CURRENT MEDICATIONS:  MEDICATIONS  (STANDING):  levothyroxine 224 MICROGram(s) Oral daily  lisinopril 5 milliGRAM(s) Oral daily  tamsulosin 0.4 milliGRAM(s) Oral <User Schedule>    Home Medications:  Ramipril 5 milliGRAM(s) Oral daily  Silodosin 5 mg OD  levothyroxine    Past Medical History  Hypertension  Prediabetes  Hypothyroid     Past Surgical History  Denies    FAMILY HISTORY:  Family history: mom  of MI @ age 55, father  of larynx cancer @ age 46    Social History:  + social alcohol. Denies ever smoking or drugs use:    Vital Signs Last 24 Hrs  T(C): 36.7 (2022 15:41), Max: 36.7 (2022 15:41)  T(F): 98 (2022 15:41), Max: 98 (2022 15:41)  HR: 89 (2022 18:40) (89 - 107)  BP: 177/99 (2022 18:40) (174/98 - 177/99)  BP(mean): --  RR: 18 (2022 18:40) (18 - 20)  SpO2: 99% (2022 18:40) (98% - 99%)    PHYSICAL EXAM:  Constitutional: Comfortable . No acute distress.   HEENT: Atraumatic and normocephalic , neck is supple . no JVD.  PEERL   CNS: A&O x3. No focal neurologic deficits.   Respiratory: CTAB. No wheezing, rhonchi or crackles   Cardiovascular:  S1S2 RRR. No murmur or rubs  Gastrointestinal: Soft non-tender and non distended . +Bowel sounds.   Extremities: No pitting  edema x 4 extremities  Psychiatric: Calm . no agitation.    LABS:                        13.7   9.54  )-----------( 273      ( 2022 17:32 )             38.4         139  |  103  |  17.0  ----------------------------<  104<H>  3.9   |  23.0  |  0.87    Ca    10.0      2022 17:32    TPro  7.3  /  Alb  4.7  /  TBili  0.3<L>  /  DBili  x   /  AST  46<H>  /  ALT  86<H>  /  AlkPhos  59      CARDIAC MARKERS ( 2022 17:32 )  x     / <0.01 ng/mL / x     / x     / x        ;p-BNP=Serum Pro-Brain Natriuretic Peptide: 10 pg/mL ( @ 17:32)      INTERPRETATION OF TELEMETRY: Reviewed by me.     EKG: Reviewed by me.     RADIOLOGY & ADDITIONAL STUDIES:      Preliminary evaluation, please await official recommendations     Assessment and recommendations are final when note is signed by the attending.                                                                        [place] [facility]                                                       [facility]                                                        Office: [address] [address]                                                       Telephone: [phone]. Fax:[phone]    CARDIOLOGY CONSULTATION NOTE                                                                                             History obtained by: Patient and medical record   obtained: No    HPI: 51 year old male with PMHx of hypothyroidism, HTN and prediabetes presents to the ER co chest pain x 2 weeks. Patient has been experiencing central, non-radiating, 3/10 intermittent chest pain of a "tightness" quality. His chest pain is mostly with activity and he has accompanying dyspnea and heart racing sensation. Has been feeling more fatigued and SOB for the last 2 week with activity and at rest. Also was at a party last week dancing/singing and felt like he was sweating more than usual. States that he was seen by his cardiologist Dr. Lee about 2-3 weeks ago and EKG done at that time was normal.  Admits to significant stress with his career and at home right now. Family history: mom  of MI @ age 55. Denies syncope, dizziness, abdominal pain N/V or diarrhea.     Cardiologist: Dr Nikolas Lee     REVIEW OF SYMPTOMS: Asymptomatic.  Cardiovascular:  + chest pain (resolved after morphine),  No dyspnea,  No fatigue, No syncope,  No palpitations, No dizziness, No Orthopnea, No Paroxsymal nocturnal dyspnea.  Respiratory:  No Dyspnea, No cough.  Genitourinary:  No dysuria, no hematuria.  Gastrointestinal:  No nausea, no vomiting. No diarrhea.  No abdominal pain.   Neurological: No headache, no dizziness, no slurred speech.  Psychiatric: No agitation, + anxiety.    ALL OTHER REVIEW OF SYSTEMS ARE NEGATIVE.    Allergies  Denies    CURRENT MEDICATIONS:  MEDICATIONS  (STANDING):  levothyroxine 224 MICROGram(s) Oral daily  lisinopril 5 milliGRAM(s) Oral daily  tamsulosin 0.4 milliGRAM(s) Oral <User Schedule>    Home Medications:  Ramipril 5 milliGRAM(s) Oral daily  Silodosin 5 mg OD  levothyroxine    Past Medical History  Hypertension  Prediabetes  Hypothyroid     Past Surgical History  Denies    FAMILY HISTORY:  Family history: mom  of MI @ age 55, father  of larynx cancer @ age 46    Social History:  + social alcohol. Denies ever smoking or drugs use:    Vital Signs Last 24 Hrs  T(C): 36.7 (2022 15:41), Max: 36.7 (2022 15:41)  T(F): 98 (2022 15:41), Max: 98 (2022 15:41)  HR: 89 (2022 18:40) (89 - 107)  BP: 177/99 (2022 18:40) (174/98 - 177/99)  BP(mean): --  RR: 18 (2022 18:40) (18 - 20)  SpO2: 99% (2022 18:40) (98% - 99%)    PHYSICAL EXAM:  Constitutional: Comfortable . No acute distress.   HEENT: Atraumatic and normocephalic , neck is supple . no JVD.  PEERL   CNS: A&O x3. No focal neurologic deficits.   Respiratory: CTAB. No wheezing, rhonchi or crackles   Cardiovascular:  S1S2 RRR. No murmur or rubs  Gastrointestinal: Soft non-tender and non distended . +Bowel sounds.   Extremities: No pitting  edema x 4 extremities  Psychiatric: Calm . no agitation.    LABS:                        13.7   9.54  )-----------( 273      ( 2022 17:32 )             38.4         139  |  103  |  17.0  ----------------------------<  104<H>  3.9   |  23.0  |  0.87    Ca    10.0      2022 17:32    TPro  7.3  /  Alb  4.7  /  TBili  0.3<L>  /  DBili  x   /  AST  46<H>  /  ALT  86<H>  /  AlkPhos  59  11    CARDIAC MARKERS ( 2022 17:32 )  x     / <0.01 ng/mL / x     / x     / x        ;p-BNP=Serum Pro-Brain Natriuretic Peptide: 10 pg/mL ( @ 17:32)      INTERPRETATION OF TELEMETRY: Reviewed by me.     EKG: Sinus tachycardia. Q waves on Lead lll may represent old MI    RADIOLOGY & ADDITIONAL STUDIES:      Preliminary evaluation, please await official recommendations     Assessment and recommendations are final when note is signed by the attending.                                                                        [place] [facility]                                                       [facility]                                                        Office: [address] [address]                                                       Telephone: [phone]. Fax:[phone]    CARDIOLOGY CONSULTATION NOTE                                                                                             History obtained by: Patient and medical record   obtained: No    HPI: 51 year old male with PMHx of hypothyroidism, HTN and prediabetes presents to the ER co chest pain x 2 weeks. Patient has been experiencing central, non-radiating, 3/10 intermittent chest pain of a "tightness" quality. His chest pain is mostly with activity and he has accompanying dyspnea and heart racing sensation. Has been feeling more fatigued and SOB for the last 2 week with activity and at rest. Also was at a party last week dancing/singing and felt like he was sweating more than usual. States that he was seen by his cardiologist Dr. Lee about 2-3 weeks ago and EKG done at that time was normal.  Admits to significant stress with his career and at home right now. Family history: mom  of MI @ age 55. Denies syncope, dizziness, abdominal pain N/V or diarrhea.     Cardiologist: Dr Nikolas Lee     REVIEW OF SYMPTOMS: Asymptomatic.  Cardiovascular:  + chest pain (resolved after morphine),  No dyspnea,  No fatigue, No syncope,  No palpitations, No dizziness, No Orthopnea, No Paroxsymal nocturnal dyspnea.  Respiratory:  No Dyspnea, No cough.  Genitourinary:  No dysuria, no hematuria.  Gastrointestinal:  No nausea, no vomiting. No diarrhea.  No abdominal pain.   Neurological: No headache, no dizziness, no slurred speech.  Psychiatric: No agitation, + anxiety.    ALL OTHER REVIEW OF SYSTEMS ARE NEGATIVE.    Allergies  Denies    CURRENT MEDICATIONS:  MEDICATIONS  (STANDING):  levothyroxine 224 MICROGram(s) Oral daily  lisinopril 5 milliGRAM(s) Oral daily  tamsulosin 0.4 milliGRAM(s) Oral <User Schedule>    Home Medications:  Ramipril 5 milliGRAM(s) Oral daily  Silodosin 5 mg OD  levothyroxine    Past Medical History  Hypertension  Prediabetes  Hypothyroid     Past Surgical History  Denies    FAMILY HISTORY:  Family history: mom  of MI @ age 55, father  of larynx cancer @ age 46    Social History:  + social alcohol. Denies ever smoking or drugs use:    Vital Signs Last 24 Hrs  T(C): 36.7 (2022 15:41), Max: 36.7 (2022 15:41)  T(F): 98 (2022 15:41), Max: 98 (2022 15:41)  HR: 89 (2022 18:40) (89 - 107)  BP: 177/99 (2022 18:40) (174/98 - 177/99)  BP(mean): --  RR: 18 (2022 18:40) (18 - 20)  SpO2: 99% (2022 18:40) (98% - 99%)    PHYSICAL EXAM:  Constitutional: Comfortable . No acute distress.   HEENT: Atraumatic and normocephalic , neck is supple . no JVD.  PEERL   CNS: A&O x3. No focal neurologic deficits.   Respiratory: CTAB. No wheezing, rhonchi or crackles   Cardiovascular:  S1S2 RRR. No murmur or rubs  Gastrointestinal: Soft non-tender and non distended . +Bowel sounds.   Extremities: No pitting  edema x 4 extremities  Psychiatric: Calm . no agitation.    LABS:                        13.7   9.54  )-----------( 273      ( 2022 17:32 )             38.4         139  |  103  |  17.0  ----------------------------<  104<H>  3.9   |  23.0  |  0.87    Ca    10.0      2022 17:32    TPro  7.3  /  Alb  4.7  /  TBili  0.3<L>  /  DBili  x   /  AST  46<H>  /  ALT  86<H>  /  AlkPhos  59      CARDIAC MARKERS ( 2022 17:32 )  x     / <0.01 ng/mL / x     / x     / x        ;p-BNP=Serum Pro-Brain Natriuretic Peptide: 10 pg/mL ( @ 17:32)      INTERPRETATION OF TELEMETRY: Reviewed by me.     EKG: Sinus tachycardia. Q waves on Lead lll may represent old MI    RADIOLOGY & ADDITIONAL STUDIES:    Chest x-ray: Unremarkable. Final report / reading pending     Preliminary evaluation, please await official recommendations     Assessment and recommendations are final when note is signed by the attending.                                                                        [place] [facility]                                                       [facility]                                                        Office: [address] [address]                                                       Telephone: [phone]. Fax:[phone]    CARDIOLOGY CONSULTATION NOTE                                                                                             History obtained by: Patient and medical record   obtained: No    HPI: 51 year old male with PMHx of hypothyroidism, HTN and prediabetes presents to the ER co chest pain x 2 weeks. Patient has been experiencing central, non-radiating, 3/10 intermittent chest pain of a "tightness" quality. His chest pain is mostly with activity and he has accompanying dyspnea and heart racing sensation. Has been feeling more fatigued and SOB for the last 2 week with activity and at rest. Also was at a party last week dancing/singing and felt like he was sweating more than usual. States that he was seen by his cardiologist Dr. Lee about 2-3 weeks ago and EKG done at that time was normal.  Admits to significant stress with his career and at home right now. Family history: mom  of MI @ age 55. Denies syncope, dizziness, abdominal pain N/V or diarrhea.     Cardiologist: Dr Nikolas Lee     REVIEW OF SYMPTOMS: Asymptomatic.  Cardiovascular:  + chest pain (resolved after morphine),  No dyspnea,  No fatigue, No syncope,  No palpitations, No dizziness, No Orthopnea, No Paroxsymal nocturnal dyspnea.  Respiratory:  No Dyspnea, No cough.  Genitourinary:  No dysuria, no hematuria.  Gastrointestinal:  No nausea, no vomiting. No diarrhea.  No abdominal pain.   Neurological: No headache, no dizziness, no slurred speech.  Psychiatric: No agitation, + anxiety.    ALL OTHER REVIEW OF SYSTEMS ARE NEGATIVE.    Allergies  Denies    CURRENT MEDICATIONS:  MEDICATIONS  (STANDING):  levothyroxine 224 MICROGram(s) Oral daily  lisinopril 5 milliGRAM(s) Oral daily  tamsulosin 0.4 milliGRAM(s) Oral <User Schedule>    Home Medications:  Ramipril 5 milliGRAM(s) Oral daily  Silodosin 5 mg OD  levothyroxine    Past Medical History  Hypertension  Prediabetes  Hypothyroid     Past Surgical History  Denies    FAMILY HISTORY:  Family history: mom  of MI @ age 55, father  of larynx cancer @ age 46    Social History:  + social alcohol. Denies ever smoking or drugs use:    Vital Signs Last 24 Hrs  T(C): 36.7 (2022 15:41), Max: 36.7 (2022 15:41)  T(F): 98 (2022 15:41), Max: 98 (2022 15:41)  HR: 89 (2022 18:40) (89 - 107)  BP: 177/99 (2022 18:40) (174/98 - 177/99)  BP(mean): --  RR: 18 (2022 18:40) (18 - 20)  SpO2: 99% (2022 18:40) (98% - 99%)    PHYSICAL EXAM:  Constitutional: Comfortable . No acute distress.   HEENT: Atraumatic and normocephalic , neck is supple . no JVD.  PEERL   CNS: A&O x3. No focal neurologic deficits.   Respiratory: CTAB. No wheezing, rhonchi or crackles   Cardiovascular:  S1S2 RRR. No murmur or rubs  Gastrointestinal: Soft non-tender and non distended . +Bowel sounds.   Extremities: No pitting  edema x 4 extremities  Psychiatric: Calm . no agitation.    LABS:                        13.7   9.54  )-----------( 273      ( 2022 17:32 )             38.4         139  |  103  |  17.0  ----------------------------<  104<H>  3.9   |  23.0  |  0.87    Ca    10.0      2022 17:32    TPro  7.3  /  Alb  4.7  /  TBili  0.3<L>  /  DBili  x   /  AST  46<H>  /  ALT  86<H>  /  AlkPhos  59      CARDIAC MARKERS ( 2022 17:32 )  x     / <0.01 ng/mL / x     / x     / x        ;p-BNP=Serum Pro-Brain Natriuretic Peptide: 10 pg/mL ( @ 17:32)      INTERPRETATION OF TELEMETRY: Reviewed by me.     EKG: Sinus tachycardia. Q waves on Lead lll     RADIOLOGY & ADDITIONAL STUDIES:    Chest x-ray: Unremarkable. Final report / reading pending     Preliminary evaluation, please await official recommendations     Assessment and recommendations are final when note is signed by the attending.

## 2022-02-11 NOTE — ED CDU PROVIDER INITIAL DAY NOTE - PHYSICAL EXAMINATION
Const: AOX3 nontoxic appearing, no apparent respiratory or physical distress. Stable gait   HEENT: NC/AT. Moist mucous membranes.  Eyes: HAN. EOMI  Neck: Soft and supple. Full ROM without pain.  Cardiac: Regular rate and regular rhythm. +S1/S2. No murmurs. Peripheral pulses 2+ and symmetric. No LE edema.  Resp: Speaking in full sentences. No evidence of respiratory distress. No wheezes, rales or rhonchi. No adventitious breath sounds   Abd: Soft, non-tender, non-distended.   Back: Spine midline and non-tender. No CVAT.  Skin: No rashes, abrasions or lacerations.  Neuro: Awake, alert & oriented x 3. Moves all extremities symmetrically.

## 2022-02-11 NOTE — ED CDU PROVIDER INITIAL DAY NOTE - NSICDXFAMILYHX_GEN_ALL_CORE_FT
FAMILY HISTORY:  Father  Still living? Unknown  Family history of malignant neoplasm of larynx, Age at diagnosis: Age Unknown    Mother  Still living? Unknown  FH: heart attack, Age at diagnosis: Age Unknown

## 2022-02-11 NOTE — CONSULT NOTE ADULT - PROBLEM SELECTOR RECOMMENDATION 2
Poor controlled  Continue Ramipril 5 mg OD  Metoprolol 5 mg IVP PRN goal   Dash diet  Monitor BP    Further recommendations base on above findings Poor controlled  Continue Ramipril 5 mg OD  Metoprolol 5 mg IVP PRN goal <140/90  Dash diet  Monitor BP    Further recommendations base on above findings Poor controlled  Continue Ramipril 5 mg OD  Metoprolol 5 mg IVP PRN goal <140/90  Dash diet  Monitor BP    Further recommendations base on above findings  Radha discussed with Dr Toure Poor controlled  Continue Ramipril 5 mg OD  Metoprolol 5 mg IVP PRN goal <140/90  Dash diet  Monitor BP    Further recommendations base on above findings  Case discussed with Dr Toure

## 2022-02-11 NOTE — ED PROVIDER NOTE - NS ED ROS FT
Constitutional: no fever, no chills, +fatigue   Head: NC, AT   Eyes: no redness   ENMT: no nasal congestion/drainage, no sore throat   CV: +chest pain, no edema  Resp: no cough, +dyspnea  GI: no abdominal pain, no nausea, no vomiting, no diarrhea  : no dysuria, no hematuria   Skin: no lesions, no rashes   Neuro: no LOC, no headache, +numbness left lateral thigh, no weakness

## 2022-02-11 NOTE — ED ADULT NURSE NOTE - OBJECTIVE STATEMENT
Pt walked in c/o chest pain on and off for 2 x days denies pmh in no acute distress pending md devora connolly rn

## 2022-02-11 NOTE — ED PROVIDER NOTE - OBJECTIVE STATEMENT
51 year old male with history of HTN and prediabetes who presents with chest pain. For the last 1-2 weeks the patient has been experiencing central, non-radiating, chest pain of a "tightness" quality. His chest pain is mostly with activity and he has accompanying dyspnea and heart racing sensation. Has been feeling more fatigued for the last week. Also was at a party last week dancing/singing and felt like he was sweating more than usual. No nausea or diaphoresis. States that he was seen by his cardiologist Dr. Lee about 2-3 weeks ago and EKG done at that time was normal. He has had a stress test which he thinks was in /. Cannot recall ever having had an ECHO. Non-smoker. Admits to significant stress with his career and at home right now. Family history: mom  of MI @ age 55, father  of larynx cancer @ age 46

## 2022-02-11 NOTE — ED PROVIDER NOTE - PROGRESS NOTE DETAILS
Fco: I rechecked the patient. He continues to have central chest tightness. Analgesia ordered in addition to repeat trops/EKGs.

## 2022-02-11 NOTE — ED CDU PROVIDER INITIAL DAY NOTE - OBJECTIVE STATEMENT
51 year old male with history of HTN, BPH, hypothyroids ,   and prediabetes who presents with chest pain. For the last 1-2 weeks the patient has been experiencing central, non-radiating, chest pain of a "tightness" quality. His chest pain is mostly with activity and he has accompanying dyspnea and heart racing sensation and sweating . Has been feeling more fatigued for the last week. States that he was seen by his cardiologist Dr. Lee about 2-3 weeks ago and EKG done at that time was normal. He has had a stress test which he thinks was in /2021. Cannot recall ever having had an ECHO. Non-smoker. Admits to significant stress with his career and at home right now.

## 2022-02-11 NOTE — CONSULT NOTE ADULT - ATTENDING COMMENTS
Chest pain  exertional ,   Risk factors , HTN , preDiabetes, FH of MI ( mother 55)   patient is experiencing a lot of stress at work /home  currently asymptomatic   EKG NSR, possibly old inferior MI   negative TNi and BNP  will order an echo to evaluate for RWMA  Stress test negative for ischemia as per patient a year ago with his cardiologist   if no recurrent chest pain, can have outpatient workup for ischemia ( prefer CT calcium score or CTA coronaries)

## 2022-02-12 VITALS
OXYGEN SATURATION: 96 % | RESPIRATION RATE: 18 BRPM | SYSTOLIC BLOOD PRESSURE: 110 MMHG | DIASTOLIC BLOOD PRESSURE: 71 MMHG | HEART RATE: 88 BPM | TEMPERATURE: 98 F

## 2022-02-12 LAB
A1C WITH ESTIMATED AVERAGE GLUCOSE RESULT: 5.6 % — SIGNIFICANT CHANGE UP (ref 4–5.6)
CHOLEST SERPL-MCNC: 180 MG/DL — SIGNIFICANT CHANGE UP
ERYTHROCYTE [SEDIMENTATION RATE] IN BLOOD: 14 MM/HR — SIGNIFICANT CHANGE UP (ref 0–20)
ESTIMATED AVERAGE GLUCOSE: 114 MG/DL — SIGNIFICANT CHANGE UP (ref 68–114)
HDLC SERPL-MCNC: 39 MG/DL — LOW
LIPID PNL WITH DIRECT LDL SERPL: 99 MG/DL — SIGNIFICANT CHANGE UP
NON HDL CHOLESTEROL: 141 MG/DL — HIGH
T3 SERPL-MCNC: 121 NG/DL — SIGNIFICANT CHANGE UP (ref 80–200)
T4 AB SER-ACNC: 9 UG/DL — SIGNIFICANT CHANGE UP (ref 4.5–12)
TRIGL SERPL-MCNC: 208 MG/DL — HIGH
TROPONIN T SERPL-MCNC: <0.01 NG/ML — SIGNIFICANT CHANGE UP (ref 0–0.06)
TSH SERPL-MCNC: 0.83 UIU/ML — SIGNIFICANT CHANGE UP (ref 0.27–4.2)

## 2022-02-12 PROCEDURE — 36415 COLL VENOUS BLD VENIPUNCTURE: CPT

## 2022-02-12 PROCEDURE — 84480 ASSAY TRIIODOTHYRONINE (T3): CPT

## 2022-02-12 PROCEDURE — G0378: CPT

## 2022-02-12 PROCEDURE — 80053 COMPREHEN METABOLIC PANEL: CPT

## 2022-02-12 PROCEDURE — 85025 COMPLETE CBC W/AUTO DIFF WBC: CPT

## 2022-02-12 PROCEDURE — 99285 EMERGENCY DEPT VISIT HI MDM: CPT | Mod: 25

## 2022-02-12 PROCEDURE — 85652 RBC SED RATE AUTOMATED: CPT

## 2022-02-12 PROCEDURE — U0005: CPT

## 2022-02-12 PROCEDURE — 83036 HEMOGLOBIN GLYCOSYLATED A1C: CPT

## 2022-02-12 PROCEDURE — 85379 FIBRIN DEGRADATION QUANT: CPT

## 2022-02-12 PROCEDURE — 93005 ELECTROCARDIOGRAM TRACING: CPT

## 2022-02-12 PROCEDURE — 84436 ASSAY OF TOTAL THYROXINE: CPT

## 2022-02-12 PROCEDURE — 84484 ASSAY OF TROPONIN QUANT: CPT

## 2022-02-12 PROCEDURE — 83880 ASSAY OF NATRIURETIC PEPTIDE: CPT

## 2022-02-12 PROCEDURE — 93306 TTE W/DOPPLER COMPLETE: CPT | Mod: 26

## 2022-02-12 PROCEDURE — 71046 X-RAY EXAM CHEST 2 VIEWS: CPT

## 2022-02-12 PROCEDURE — 96374 THER/PROPH/DIAG INJ IV PUSH: CPT | Mod: XU

## 2022-02-12 PROCEDURE — 80061 LIPID PANEL: CPT

## 2022-02-12 PROCEDURE — U0003: CPT

## 2022-02-12 PROCEDURE — 93306 TTE W/DOPPLER COMPLETE: CPT

## 2022-02-12 PROCEDURE — 84443 ASSAY THYROID STIM HORMONE: CPT

## 2022-02-12 PROCEDURE — 99217: CPT

## 2022-02-12 RX ADMIN — LISINOPRIL 5 MILLIGRAM(S): 2.5 TABLET ORAL at 05:34

## 2022-02-12 NOTE — ED CDU PROVIDER DISPOSITION NOTE - NSFOLLOWUPINSTRUCTIONS_ED_ALL_ED_FT
Chest Pain    Chest pain can be caused by many different conditions which may or may not be dangerous. Causes include heartburn, lung infections, heart attack, blood clot in lungs, skin infections, strain or damage to muscle, cartilage, or bones, etc. In addition to a history and physical examination, an electrocardiogram (ECG) or other lab tests may have been performed to determine the cause of your chest pain. Follow up with your primary care provider or with a cardiologist as instructed.     SEEK IMMEDIATE MEDICAL CARE IF YOU HAVE ANY OF THE FOLLOWING SYMPTOMS: worsening chest pain, coughing up blood, unexplained back/neck/jaw pain, severe abdominal pain, dizziness or lightheadedness, fainting, shortness of breath, sweaty or clammy skin, vomiting, or racing heart beat. These symptoms may represent a serious problem that is an emergency. Do not wait to see if the symptoms will go away. Get medical help right away. Call 911 and do not drive yourself to the hospital.     Please follow up with your doctor within 24 hours  Please follow up with cardiology within 3 days

## 2022-02-12 NOTE — ED CDU PROVIDER SUBSEQUENT DAY NOTE - ATTENDING CONTRIBUTION TO CARE
I agree with the PA's note and was available for any issues/concerns. I was directly involved in patient care. My brief overall assessment is as follows:    no acute events overnight; awaiting cardiac work up

## 2022-02-12 NOTE — ED CDU PROVIDER SUBSEQUENT DAY NOTE - HISTORY
Pt  denies any chest pain over night . Trop x 3 negative and EKg W.o any acute changes. No Event over night. VS improved W.o any adding extra hypertension meds.  pending Echo as per [facility] recommendation

## 2022-02-12 NOTE — ED ADULT NURSE REASSESSMENT NOTE - NS ED NURSE REASSESS COMMENT FT1
Assumed care of the patient @0730. Pt A&Ox4. VSS afebrile. Cardiac monitor in place sinus rhythm. Denies chest pain at this time. Pt awaiting ECHO. Patient in understanding of plan of care. Patient with no further questions for the RN. Resting in comfort. Call bell within reach and encouraged to use when assistance needed. Will continue to monitor.
Pt is A&Ox4, no complaints of chest pain or SOB. Tele monitoring continues. Pt awaiting echo in AM. Pt resting comfortably in stretcher at this time. Fall and safety precautions maintained. Call bell within reach.

## 2022-02-12 NOTE — ED CDU PROVIDER SUBSEQUENT DAY NOTE - MEDICAL DECISION MAKING DETAILS
51 year old male with history of HTN, BPH , hypothyroids.  prediabetes who presents with chest pain. For the last 1-2 weeks the patient has been experiencing central, non-radiating, chest pain of a "tightness" quality. His chest pain is mostly with activity and he has accompanying dyspnea and heart racing sensation and sweating   pt is seen by [facility]   Echo  in AM   serial trop and Ekg x 2 negative   home med Htn on ramipril 5Mg  Echo in AM   NPo after mid night

## 2022-02-12 NOTE — ED CDU PROVIDER DISPOSITION NOTE - CLINICAL COURSE
50 y/o male placed in observation for cardiac work up. serial trop negative. PT evaluated by cardiology and TTE obtained. results noted. Will d/c with cardio follow up. All results discussed with PT and follow up plan discussed. He remains chest pain free throughout this morning. ED return precautions discussed.

## 2022-02-12 NOTE — ED CDU PROVIDER DISPOSITION NOTE - PATIENT PORTAL LINK FT
You can access the FollowMyHealth Patient Portal offered by [facility] by registering at the following website: [email or web address]. By joining [email or web address] FollowMyHealth portal, you will also be able to view your health information using other applications (apps) compatible with our system.

## 2022-02-12 NOTE — ED ADULT NURSE REASSESSMENT NOTE - COMFORT CARE
plan of care explained/po fluids offered/wait time explained
darkened lights/plan of care explained/repositioned/side rails up

## 2022-02-12 NOTE — ED CDU PROVIDER DISPOSITION NOTE - CARE PROVIDER_API CALL
Star Toure)  Cardiovascular Disease  [address] [facility], [address] [id number]  Phone: [phone]  Fax: [phone]  Follow Up Time:

## 2023-07-12 NOTE — ED CDU PROVIDER INITIAL DAY NOTE - MEDICAL DECISION MAKING DETAILS
51 year old male with history of HTN, BPH , hypothyroids.  prediabetes who presents with chest pain. For the last 1-2 weeks the patient has been experiencing central, non-radiating, chest pain of a "tightness" quality. His chest pain is mostly with activity and he has accompanying dyspnea and heart racing sensation and sweating   pt is seen by [facility]   Echo  in AM   serial trop and Ekg x 2   home med Htn on ramipril 5Mg  Echo in AM   NPo after mid night
There are no Wet Read(s) to document.